# Patient Record
Sex: FEMALE | Race: WHITE | NOT HISPANIC OR LATINO | Employment: FULL TIME | URBAN - METROPOLITAN AREA
[De-identification: names, ages, dates, MRNs, and addresses within clinical notes are randomized per-mention and may not be internally consistent; named-entity substitution may affect disease eponyms.]

---

## 2018-11-20 ENCOUNTER — OFFICE VISIT CONVERTED (OUTPATIENT)
Dept: PULMONOLOGY | Facility: CLINIC | Age: 48
End: 2018-11-20
Attending: INTERNAL MEDICINE

## 2018-12-11 ENCOUNTER — OFFICE VISIT CONVERTED (OUTPATIENT)
Dept: SURGERY | Facility: CLINIC | Age: 48
End: 2018-12-11
Attending: SURGERY

## 2018-12-21 ENCOUNTER — OFFICE VISIT CONVERTED (OUTPATIENT)
Dept: PULMONOLOGY | Facility: CLINIC | Age: 48
End: 2018-12-21
Attending: INTERNAL MEDICINE

## 2019-02-15 ENCOUNTER — OFFICE VISIT CONVERTED (OUTPATIENT)
Dept: PULMONOLOGY | Facility: CLINIC | Age: 49
End: 2019-02-15
Attending: INTERNAL MEDICINE

## 2019-03-01 ENCOUNTER — HOSPITAL ENCOUNTER (OUTPATIENT)
Dept: GASTROENTEROLOGY | Facility: HOSPITAL | Age: 49
Setting detail: HOSPITAL OUTPATIENT SURGERY
Discharge: HOME | End: 2019-03-01
Attending: INTERNAL MEDICINE

## 2019-03-01 LAB
EPI CELLS NFR FLD: 12 %
GLUCOSE BLD-MCNC: 89 MG/DL (ref 65–99)
HCG UR QL: NEGATIVE
LYMPHOCYTES NFR FLD MANUAL: 9 %
MACROPHAGE FLUID: 20 /100{WBCS}
NEUTROPHILS NFR FLD MANUAL: 59 %
VISUAL PRESENCE OF BLOOD: NORMAL

## 2019-03-03 LAB
BACTERIA SPEC AEROBE CULT: ABNORMAL
BACTERIA SPEC AEROBE CULT: ABNORMAL
CONV BRONCHIAL WASH CULTURE: ABNORMAL

## 2019-03-07 LAB
CONV ADENOVIRUS  (BAL OR WASH): NEGATIVE
FLUAV RNA SPEC QL NAA+PROBE: NEGATIVE
FLUBV RNA ISLT QL NAA+PROBE: NEGATIVE
HMPV RNA SPEC QL NAA+PROBE: NEGATIVE
HPIV1 RNA ISLT QL NAA+PROBE: NEGATIVE
HPIV2 SPEC QL CULT: NEGATIVE
HPIV3 SPEC QL CULT: NEGATIVE
RHINOVIRUS RNA SPEC QL NAA+PROBE: NEGATIVE
RSV A: NEGATIVE
RSV B RNA SPEC QL NAA+PROBE: NEGATIVE

## 2019-03-13 ENCOUNTER — OFFICE VISIT CONVERTED (OUTPATIENT)
Dept: PULMONOLOGY | Facility: CLINIC | Age: 49
End: 2019-03-13
Attending: INTERNAL MEDICINE

## 2019-04-03 ENCOUNTER — OFFICE VISIT (OUTPATIENT)
Dept: GASTROENTEROLOGY | Facility: CLINIC | Age: 49
End: 2019-04-03

## 2019-04-03 VITALS
HEIGHT: 64 IN | TEMPERATURE: 98.4 F | BODY MASS INDEX: 28.92 KG/M2 | DIASTOLIC BLOOD PRESSURE: 74 MMHG | WEIGHT: 169.4 LBS | SYSTOLIC BLOOD PRESSURE: 112 MMHG

## 2019-04-03 DIAGNOSIS — R19.7 DIARRHEA, UNSPECIFIED TYPE: Primary | ICD-10-CM

## 2019-04-03 DIAGNOSIS — R79.82 ELEVATED C-REACTIVE PROTEIN (CRP): ICD-10-CM

## 2019-04-03 DIAGNOSIS — R76.8 P-ANCA TITER POSITIVE: ICD-10-CM

## 2019-04-03 DIAGNOSIS — R10.30 LOWER ABDOMINAL PAIN: ICD-10-CM

## 2019-04-03 PROCEDURE — 99204 OFFICE O/P NEW MOD 45 MIN: CPT | Performed by: INTERNAL MEDICINE

## 2019-04-03 RX ORDER — METHYLPREDNISOLONE 4 MG/1
4 TABLET ORAL DAILY
COMMUNITY

## 2019-04-03 RX ORDER — ALPRAZOLAM 1 MG/1
1 TABLET ORAL 2 TIMES DAILY
Refills: 0 | COMMUNITY
Start: 2019-03-18

## 2019-04-03 RX ORDER — MONTELUKAST SODIUM 10 MG/1
10 TABLET ORAL NIGHTLY
COMMUNITY

## 2019-04-03 RX ORDER — ASPIRIN 81 MG/1
81 TABLET ORAL DAILY
COMMUNITY

## 2019-04-03 RX ORDER — LISINOPRIL 10 MG/1
10 TABLET ORAL DAILY
Refills: 1 | COMMUNITY
Start: 2019-03-04

## 2019-04-03 RX ORDER — GABAPENTIN 600 MG/1
600 TABLET ORAL 3 TIMES DAILY
COMMUNITY

## 2019-04-03 RX ORDER — ROFLUMILAST 500 UG/1
500 TABLET ORAL DAILY
COMMUNITY

## 2019-04-03 RX ORDER — SERTRALINE HYDROCHLORIDE 100 MG/1
100 TABLET, FILM COATED ORAL DAILY
COMMUNITY
Start: 2019-02-04

## 2019-04-03 RX ORDER — LANSOPRAZOLE 30 MG/1
30 CAPSULE, DELAYED RELEASE ORAL DAILY
COMMUNITY

## 2019-04-03 RX ORDER — HYOSCYAMINE SULFATE 0.125 MG
0.12 TABLET ORAL EVERY 6 HOURS PRN
Qty: 90 TABLET | Refills: 1 | Status: SHIPPED | OUTPATIENT
Start: 2019-04-03

## 2019-04-03 RX ORDER — FEXOFENADINE HCL 180 MG/1
180 TABLET ORAL DAILY
COMMUNITY

## 2019-04-03 RX ORDER — IBUPROFEN 800 MG/1
800 TABLET ORAL EVERY 6 HOURS PRN
COMMUNITY

## 2019-04-03 NOTE — PROGRESS NOTES
Chief Complaint   Patient presents with   • Diarrhea   • GI Problem     burning mouth   • Difficulty Swallowing   • Nausea   • Heartburn   • Constipation       Subjective     HPI    Abigail Keene is a 48 y.o. female with a past medical history noted below who presents for evaluation of multiple GI complaints but primarily diarrhea.  Symptoms started a year ago without precipitant.  She developed diarrhea.  She reported BMs after eating, drinking, up to 10x per day.  Stool was loose.  Associated with cramping.  This included nocturnal stools.  Tried imodium which helped but did not stop the symptoms.  She was also having nausea and vomiting too.    EGD and colonoscopy with Dr Concepcion in December 2018-- records reviewed-- hemorrhoids, sigmoid HP, no H pylori.  He removed her hemorrhoids surgically.  No random colon biopsies were taken    Since her colonoscopy, she now complains of joint pain-- every joint hurts.  She is now having alternating diarrhea and constipation.  No BMs for a few days followed by by diarrhea.  She has lower abdominal pain/cramping before her bowel movements.    Complains of a burning mouth, bad breath.    She had lab tests in February showing an elevated CRP at 7.5.  Her atypical PANCA was positive and her rheumatologist told her she had GI issues.      She is steroid dependent for her asthma.      She sees Dr Santo in [place] for rheumatology.    Mother with colon polyps.  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    Not smoking, not drinking  .    Negative celiac.  Mildly elevated AST and ALT (just above 30) that has been chronic.  She reports imaging showing fatty liver.    She is from [facility].      Past Medical History:   Diagnosis Date   • Adrenal insufficiency (CMS/HCC)    • Anemia    • Fibromyalgia    • GERD (gastroesophageal reflux disease)    • History of Legionnaire's disease    • Hypertension    • Polyarthritis           Current Outpatient Medications:   •  Albuterol (VENTOLIN IN), Inhale 2 puffs Every 4 (Four) Hours., Disp: , Rfl:   •  ALPRAZolam (XANAX) 1 MG tablet, Take 1 mg by mouth 2 (Two) Times a Day., Disp: , Rfl: 0  •  aspirin 81 MG EC tablet, Take 81 mg by mouth Daily., Disp: , Rfl:   •  Azelastine-Fluticasone (DYMISTA NA), 1 spray into the nostril(s) as directed by provider 2 (Two) Times a Day., Disp: , Rfl:   •  Beclomethasone Dipropionate (QVAR IN), Inhale Daily., Disp: , Rfl:   •  budesonide 0.25 MG/2ML suspension, Inhale Daily., Disp: , Rfl:   •  Cyanocobalamin (VITAMIN B-12 PO), Take  by mouth Daily., Disp: , Rfl:   •  fexofenadine (ALLEGRA) 180 MG tablet, Take 180 mg by mouth Daily., Disp: , Rfl:   •  FORMOTEROL FUMARATE IN, Inhale 20 mg 2 (Two) Times a Day., Disp: , Rfl:   •  gabapentin (NEURONTIN) 600 MG tablet, Take 600 mg by mouth 3 (Three) Times a Day., Disp: , Rfl:   •  HYDROcod Polst-CPM Polst ER (TUSSIONEX PENNKINETIC) 10-8 MG/5ML ER suspension, Take  by mouth 2 (Two) Times a Day., Disp: , Rfl: 0  •  ibuprofen (ADVIL,MOTRIN) 800 MG tablet, Take 800 mg by mouth Every 6 (Six) Hours As Needed for Mild Pain ., Disp: , Rfl:   •  ipratropium (ATROVENT HFA) 17 MCG/ACT inhaler, Inhale 2 puffs 4 (Four) Times a Day., Disp: , Rfl:   •  ipratropium-albuterol (COMBIVENT RESPIMAT)  MCG/ACT inhaler, Inhale 1 puff 4 (Four) Times a Day As Needed for Wheezing., Disp: , Rfl:   •  lansoprazole (PREVACID) 30 MG capsule, Take 30 mg by mouth Daily., Disp: , Rfl:   •  lisinopril (PRINIVIL,ZESTRIL) 10 MG tablet, Take 10 mg by mouth Daily., Disp: , Rfl: 1  •  methylPREDNISolone (MEDROL) 4 MG tablet, Take 4 mg by mouth Daily., Disp: , Rfl:   •  montelukast (SINGULAIR) 10 MG tablet, Take 10 mg by mouth Every Night., Disp: , Rfl:   •  Multiple Vitamins-Minerals (ZINC PO), Take  by mouth Daily., Disp: , Rfl:   •  Pyridoxine HCl (VITAMIN B6 PO), Take  by mouth Daily., Disp: , Rfl:   •  roflumilast (DALIRESP) 500 MCG tablet  tablet, Take 500 mcg by mouth Daily., Disp: , Rfl:   •  sertraline (ZOLOFT) 100 MG tablet, Take 100 mg by mouth Daily., Disp: , Rfl:   •  tiotropium (SPIRIVA) 18 MCG per inhalation capsule, Place 1 capsule into inhaler and inhale Daily., Disp: , Rfl:   •  VITAMIN A PO, Take  by mouth Daily., Disp: , Rfl:   •  hyoscyamine (ANASPAZ,LEVSIN) 0.125 MG tablet, Take 1 tablet by mouth Every 6 (Six) Hours As Needed for Cramping or Diarrhea., Disp: 90 tablet, Rfl: 1    Allergies   Allergen Reactions   • Lincomycin Anaphylaxis   • Celexa [Citalopram] Hives       Social History     Socioeconomic History   • Marital status: Single     Spouse name: Not on file   • Number of children: Not on file   • Years of education: Not on file   • Highest education level: Not on file   Tobacco Use   • Smoking status: Former Smoker   • Smokeless tobacco: Never Used       Family History   Problem Relation Age of Onset   • Liver cancer Maternal Great-Grandfather        Review of Systems   Constitutional: Negative for activity change, appetite change and fatigue.   HENT: Negative for sore throat and trouble swallowing.         Burning mouth   Respiratory: Negative.    Cardiovascular: Negative.    Gastrointestinal: Positive for abdominal pain and diarrhea. Negative for abdominal distention and blood in stool.   Endocrine: Negative for cold intolerance and heat intolerance.   Genitourinary: Negative for difficulty urinating, dysuria and frequency.   Musculoskeletal: Positive for arthralgias. Negative for back pain and myalgias.   Skin: Negative.    Hematological: Negative for adenopathy. Does not bruise/bleed easily.   All other systems reviewed and are negative.      Objective     Vitals:    04/03/19 1609   BP: 112/74   Temp: 98.4 °F (36.9 °C)         04/03/19  1609   Weight: 76.8 kg (169 lb 6.4 oz)     Body mass index is 29.08 kg/m².    Physical Exam   Constitutional: She is oriented to person, place, and time. She appears well-developed and  well-nourished. No distress.   HENT:   Head: Normocephalic and atraumatic.   Right Ear: External ear normal.   Left Ear: External ear normal.   Nose: Nose normal.   Mouth/Throat: Oropharynx is clear and moist.   Eyes: Conjunctivae and EOM are normal. Right eye exhibits no discharge. Left eye exhibits no discharge. No scleral icterus.   Neck: Normal range of motion. Neck supple. No thyromegaly present.   No supraclavicular adenopathy   Cardiovascular: Normal rate, regular rhythm, normal heart sounds and intact distal pulses. Exam reveals no gallop.   No murmur heard.  No lower extremity edema   Pulmonary/Chest: Effort normal and breath sounds normal. No respiratory distress. She has no wheezes.   Abdominal: Soft. Normal appearance and bowel sounds are normal. She exhibits no distension and no mass. There is no hepatosplenomegaly. There is no tenderness. There is no rigidity, no rebound and no guarding. No hernia.   Genitourinary:   Genitourinary Comments: Rectal exam deferred   Musculoskeletal: Normal range of motion. She exhibits no edema or tenderness.   No atrophy of upper or lower extremities.  Normal digits and nails of both hands.   Lymphadenopathy:     She has no cervical adenopathy.   Neurological: She is alert and oriented to person, place, and time. She displays no atrophy. Coordination normal.   Skin: Skin is warm and dry. No rash noted. She is not diaphoretic. No erythema.   Psychiatric: She has a normal mood and affect. Her behavior is normal. Judgment and thought content normal.   Vitals reviewed.      No results found for: WBC, RBC, HGB, HCT, MCV, MCH, MCHC, RDW, RDWSD, MPV, PLT, NEUTRORELPCT, LYMPHORELPCT, MONORELPCT, EOSRELPCT, BASORELPCT, AUTOIGPER, NEUTROABS, LYMPHSABS, MONOSABS, EOSABS, BASOSABS, AUTOIGNUM, NRBC    No results found for: GLUCOSE, NA, K, CO2, CL, ANIONGAP, CREATININE, BUN, BCR, CALCIUM, EGFRIFNONA, ALKPHOS, PROTEINTOT, ALT, AST, BILITOT, ALBUMIN, GLOB, LABIL2      Imaging Results  (last 7 days)     ** No results found for the last 168 hours. **            No notes on file    Assessment/Plan    Diarrhea: Present for about a year.  I reviewed her colonoscopy, reports of normal tissue though no random biopsies were taken.  Certainly there is concern that she could have an inflammatory bowel disease however P Aruna is usually very nonspecific for this.  Also given her recent onset of now alternating diarrhea and constipation this makes me think of a more likely irritable bowel syndrome    Lower abdominal pain: Preceding her bowel movements    Elevated CRP: Possibly IBD related but she also has chronic lung disease as well as possible rheumatologic disease    P-ANCA positive: I am uncertain of the significance of this    Plan  Trial of Levsin for her abdominal pain, diarrhea  We will check a CT of the abdomen and pelvis to assess for any inflammation of the small intestine, colon--I have given her a handwritten order for this that she wishes to have this done back home  I am unclear on how all of her other symptoms including the burning gums/mouth and joint pain correlate here; she does report long-term steroid use for her asthma  We may ultimately need to end up repeating her colonoscopy.  Unfortunately her living so far away is likely to be an impediment    Abigail was seen today for diarrhea, gi problem, difficulty swallowing, nausea, heartburn and constipation.    Diagnoses and all orders for this visit:    Diarrhea, unspecified type    Lower abdominal pain    P-ANCA titer positive    Elevated C-reactive protein (CRP)    Other orders  -     hyoscyamine (ANASPAZ,LEVSIN) 0.125 MG tablet; Take 1 tablet by mouth Every 6 (Six) Hours As Needed for Cramping or Diarrhea.        I have discussed the above plan with the patient.  They verbalize understanding and are in agreement with the plan.  They have been advised to contact the office for any questions, concerns, or changes related to their  health.    Dictated utilizing Dragon dictation

## 2019-04-03 NOTE — PATIENT INSTRUCTIONS
Try the levsin for the pain, diarrhea    CT scan of the abdomen    For any additional questions, concerns or changes to your condition after today's office visit please contact the office at [phone].

## 2019-04-18 DIAGNOSIS — R19.7 DIARRHEA, UNSPECIFIED TYPE: ICD-10-CM

## 2019-04-18 DIAGNOSIS — R10.30 LOWER ABDOMINAL PAIN: Primary | ICD-10-CM

## 2019-04-29 NOTE — PROGRESS NOTES
Her CT scan showed mild fatty liver, a left ovarian cyst that is considered benign and a left kidney stone that is nonobstructive.   No evidence of any inflammation in the GI tract.

## 2019-04-30 ENCOUNTER — TELEPHONE (OUTPATIENT)
Dept: GASTROENTEROLOGY | Facility: CLINIC | Age: 49
End: 2019-04-30

## 2019-04-30 NOTE — TELEPHONE ENCOUNTER
Called pt and advised per Dr Donohue that her ct scan showed mild fatty liver, a left ovarian cyst that is considered benign and a left kidney stone that is nonobstructive.  No evidence of any inflammation in the gi tract.  Pt verb understanding.

## 2019-04-30 NOTE — TELEPHONE ENCOUNTER
----- Message from Ayana Donohue MD sent at 4/29/2019  5:26 PM EDT -----  Her CT scan showed mild fatty liver, a left ovarian cyst that is considered benign and a left kidney stone that is nonobstructive.   No evidence of any inflammation in the GI tract.

## 2021-05-15 VITALS — BODY MASS INDEX: 28.04 KG/M2 | WEIGHT: 164.25 LBS | HEIGHT: 64 IN | RESPIRATION RATE: 14 BRPM

## 2021-05-28 VITALS
RESPIRATION RATE: 12 BRPM | DIASTOLIC BLOOD PRESSURE: 80 MMHG | HEIGHT: 64 IN | HEIGHT: 64 IN | HEART RATE: 94 BPM | BODY MASS INDEX: 30.48 KG/M2 | SYSTOLIC BLOOD PRESSURE: 124 MMHG | TEMPERATURE: 98.4 F | TEMPERATURE: 98.1 F | TEMPERATURE: 98.4 F | OXYGEN SATURATION: 96 % | WEIGHT: 172 LBS | WEIGHT: 165.5 LBS | OXYGEN SATURATION: 99 % | WEIGHT: 167.31 LBS | DIASTOLIC BLOOD PRESSURE: 80 MMHG | HEIGHT: 64 IN | RESPIRATION RATE: 16 BRPM | BODY MASS INDEX: 28.56 KG/M2 | SYSTOLIC BLOOD PRESSURE: 139 MMHG | SYSTOLIC BLOOD PRESSURE: 133 MMHG | HEART RATE: 99 BPM | RESPIRATION RATE: 14 BRPM | HEART RATE: 126 BPM | RESPIRATION RATE: 12 BRPM | HEART RATE: 98 BPM | HEIGHT: 63 IN | BODY MASS INDEX: 28.25 KG/M2 | DIASTOLIC BLOOD PRESSURE: 88 MMHG | TEMPERATURE: 98.8 F | BODY MASS INDEX: 28.42 KG/M2 | DIASTOLIC BLOOD PRESSURE: 91 MMHG | SYSTOLIC BLOOD PRESSURE: 126 MMHG | OXYGEN SATURATION: 96 % | WEIGHT: 166.5 LBS | OXYGEN SATURATION: 99 %

## 2021-05-28 NOTE — PROGRESS NOTES
Patient: LINDA STEVEN     Acct: [id number]     Report: #[id number]  UNIT #: [id number]     : 1970    Encounter Date:2018  PRIMARY CARE: MONA ADHIKARI  ***Signed***  --------------------------------------------------------------------------------------------------------------------  Chief Complaint      Encounter Date      Dec 21, 2018            Primary Care Provider            mona adhikari            Referring Provider            mona adhikari            Patient Complaint      Patient is complaining of      1 mo f/u chest ct results            VITALS      Height 5 ft 4 in / 162.56 cm      Weight 167 lbs 5 oz / 75.727854 kg      BSA 1.81 m2      BMI 28.7 kg/m2      Temperature 98.4 F / 36.89 C - Oral      Pulse 98      Respirations 14      Blood Pressure 124/80 Sitting, Left Arm      Pulse Oximetry 96%, roomair      Initial Exhaled Nitrous Oxide      Date:  2018      Exhaled Nitrous Oxide Results:  120            Repeated Exhaled Nitrous Oxide      Date:  Dec 21, 2018      Repeated Nitrous Oxide Results:  70            HPI      The patient is a very pleasant 48 year old  female with very severe     allergic asthma here today for follow up.             Since her last office visit she is still in the process of getting her Xolair as    she had a fasenra allergy. She felt better after completing her course of     steroids and placed on Perforomist and Pulmicort however she has been having     worsening symptoms since Monday. She gets short of breath with almost any     activity and has wheezing nonstop. She has a chronic cough that is productive of    thin, clear sputum and has recently been yellow and green. She denies any fevers    or chills but has been fatigued with some sick contacts. She denies any body     aches or myalgias. She denies any nausea and vomiting, fevers or chills,     headaches or hemoptysis or chest pain. She has a history of elevated IgE of 57     and FEV1 of  56%. She also has a history of alpha-1 antitrypsin heterozygosity     with genotype MZ level 175. She denies any other complaints and is able to     perform her activities of daily living without difficulty and denies any swollen    lymph nodes or glands in her head and neck.             I have personally reviewed the Review of Systems, past family, social, surgical     and medical histories and I agree with the findings.            ROS      Constitutional:  Denies: Fatigue, Fever, Weight gain, Weight loss, Chills,     Insomnia, Other      Respiratory/Breathing:  Complains of: Shortness of air, Wheezing, Cough; Denies:    Hemoptysis, Pleuritic pain, Other      Endocrine:  Denies: Polydipsia, Polyuria, Heat/cold intolerance, Abnorml     menstrual pattern, Diabetes, Other      Eyes:  Denies: Blurred vision, Vision Changes, Other      Ears, nose, mouth, throat:  Denies: Mouth lesions, Thrush, Throat pain,     Hoarseness, Allergies/Hay Fever, Post Nasal Drip, Headaches, Recent Head Injury,    Nose Bleeding, Neck Stiffness, Thyroid Mass, Hearing Loss, Ear Fullness, Dry     Mouth, Nasal or Sinus Pain, Dry Lips, Nasal discharge, Nasal congestion, Other      Cardiovascular:  Denies: Palpitations, Syncope, Claudication, Chest Pain, Wake     up Gasping for air, Leg Swelling, Irregular Heart Rate, Cyanosis, Dyspnea on     Exertion, Other      Gastrointestinal:  Denies: Nausea, Constipation, Diarrhea, Abdominal pain,     Vomiting, Difficulty Swallowing, Reflux/Heartburn, Dysphagia, Jaundice,     Bloating, Melena, Bloody stools, Other      Genitourinary:  Denies: Urinary frequency, Incontinence, Hematuria, Urgency,     Nocturia, Dysuria, Testicular problems, Other      Musculoskeletal:  Denies: Joint Pain, Joint Stiffness, Joint Swelling, Myalgias,    Other      Hematologic/lymphatic:  DENIES: Lymphadenopathy, Bruising, Bleeding tendencies,     Other      Neurological:  Denies: Headache, Numbness, Weakness, Seizures, Other       Psychiatric:  Denies: Anxiety, Appropriate Effect, Depression, Other      Sleep:  No: Excessive daytime sleep, Morning Headache?, Snoring, Insomnia?, Stop    breathing at sleep?, Other      Integumentary:  Denies: Rash, Dry skin, Skin Warm to Touch, Other      Immunologic/Allergic:  Denies: Latex allergy, Seasonal allergies, Asthma,     Urticaria, Eczema, Other      Immunization status:  No: Up to date            FAMILY/SOCIAL/MEDICAL HX      Surgical History:  Yes: Bowel Surgery, Cholecystectomy, Head Surgery (sinus x     3), Hernia Surgery, Oral Surgery (WISDOM TEETH EXTRACTION), Other Surgeries (ranulfo    my tuck/ sleep apnea surgery)      Stroke - Family Hx:  Mother      Heart - Family Hx:  Father      Diabetes - Family Hx:  Father      Cancer/Type - Family Hx:  Grandparent      Is Father Still Living?:  Yes      Is Mother Still Living?:  Yes       Family History:  Yes      Social History:  No Tobacco Use, No Alcohol Use, No Recreational Drug use      Smoking status:  Former smoker (on and off smoker x 20 y quit 2006)      Anticoagulation Therapy:  No      Antibiotic Prophylaxis:  No      Medical History:  Yes: Arthritis, Asthma (NEB [place],INHALERS), Chronic     Bronchitis/COPD, Diabetes (TYPE II, DOES NOT CHECK IN BS), Hemorrhoids/Rectal     Prob (HIATAL HERNIA, DIARRHEA, VOMITING, NAUSEA, HEMORRHOID), Shortness Of     Breath, Stroke; No: Blood Disease, Chemotherapy/Cancer, Deafness or Ringing     Ears, Sinus Trouble, Miscellaneous Medical/oth      Psychiatric History      none            PREVENTION      Hx Influenza Vaccination:  Yes      Date Influenza Vaccine Given:  Nov 1, 2018      Influenza Vaccine Declined:  No      2 or More Falls Past Year?:  No      Fall Past Year with Injury?:  No      Hx Pneumococcal Vaccination:  Yes      Encouraged to follow-up with:  PCP regarding preventative exams.      Chart initiated by      gui/ ma            ALLERGIES/MEDICATIONS      Allergies:        Coded Allergies:              CITALOPRAM (Verified  Allergy, Unknown, ITCH,HIVES, 12/21/18)           LINCOMYCIN (Verified  Allergy, Unknown, ANAPHYLACTIC, 12/21/18)      Medications    Last Reconciled on 12/21/18 16:20 by KAR CERVANTES MD      Fluconazole (Diflucan) 150 Mg Tablet      150 MG PO ONCE for 1 Day, #1 TAB         Prov: KAR CERVANTES         12/21/18       Amoxicillin/Clavulanate K (Augmentin 875/125 Mg) 1 Each Tablet      875 MG PO BID, #14 TAB 0 Refills         Prov: KAR CERVANTES         12/21/18       predniSONE* (Deltasone*) 10 Mg Tablet      10 MG PO ASDIR, #45 TAB 0 Refills         Prov: KAR CERVANTES         12/21/18       Beclomethasone Dipropionate (Qvar 80 Redihaler 10.6 GM) 10.6 Gm Hfa.aeroba      1 PUFF INH RTBID, #1 INH 5 Refills         Prov: KAR CERVANTES         12/21/18       Tiotropium Bromide (Spiriva Respimat 1.25 mcg/puff) 4 Gm Mist.inhal      2 PUFFS INH RTQDAY, #1 INH 5 Refills         Prov: KAR CERVANTES         12/21/18       Hydrocodone/Acetaminophen 5/325 MG (Hydrocodone/Acetaminophen 5/325 MG) 1 Each     Tablet      1 TAB PO Q4H PRN for BREAKTHROUGH PAIN, TAB 0 Refills         Reported         12/21/18       (Fasenra Injection)   No Conflict Check               Reported         12/13/18       Ranitidine Hcl (Ranitidine*) 150 Mg Tablet      150 MG PO BID, #60 TAB 0 Refills         Reported         12/13/18       Aspirin EC (Aspirin EC) 81 Mg Tablet.dr      81 MG PO QDAY, #30 TAB 0 Refills         Reported         12/13/18       Sertraline HCl (Sertraline*) 50 Mg Tablet      50 MG PO QDAY, #30 TAB 0 Refills         Reported         12/13/18       Cholecalciferol (Vitamin D3) 50,000 Unit Capsule      35026 UNITS PO Q7D, CAP         Reported         12/13/18       Hydrocodone/Chlorpheniramine (Hydrocodone/Chlorpheniramine) 473 Ml Mireille.er.12h      5 ML PO BID PRN for COUGH, #60 ML         Reported         12/13/18       Neb-Albuterol/Ipratropium (Ipratropium/Albuterol) 3 Ml Ampul.neb      3 ML  INH Q6H PRN for SHORTNESS OF BREATH, #120 NEB 0 Refills         Reported         12/13/18       Neb-Budesonide (Pulmicort) 1 Mg Nebu      1 MG INH RTBID, #60 NEB 5 Refills         Prov: KAR CERVANTES         11/20/18       Formoterol Fumarate (Perforomist) 20 Mcg/2 Ml Vial.neb      20 MCG INH BID, #60 NEB 5 Refills         Prov: KAR CERVANTES         11/20/18       Azelastine/Fluticasone (Dymista Nasal Spray) 23 Gm Spray.pump      1 SPRAYS NARE EACH BID, #1 BOTTLE 5 Refills         Prov: KAR CERVANTES         11/20/18       Lansoprazole (Lansoprazole*) 30 Mg Capsule.dr      30 MG PO QDAY, CAP         Reported         11/20/18       Promethazine HCl (Phenergan*) 25 Mg Tablet      25 MG PO BID, #60 TAB 0 Refills         Reported         11/20/18       Alprazolam (Alprazolam) 1 Mg Tablet      1 MG PO BID, #60 TAB         Reported         11/20/18       Lisinopril* (Lisinopril*) 10 Mg Tablet      10 MG PO QDAY, #30 TAB 0 Refills         Reported         11/20/18       methylPREDNISolone (Medrol*) 4 Mg Tablet      6 MG PO BID, TAB 0 Refills         Reported         11/20/18       MDI-Albuterol (Ventolin HFA) 8 Gm Hfa.aer.ad      2 PUFFS INH Q4H PRN for SHORTNESS OF BREATH, #1 MDI 0 Refills         Reported         11/20/18       Montelukast Sodium (Singulair*) 10 Mg Tablet      10 MG PO QDAY, #30 TAB 0 Refills         Reported         11/20/18       Roflumilast (Daliresp) 500 Mcg Tab      500 MCG PO QDAY for 30 Days, #30 TAB         Reported         11/20/18       Fexofenadine Hcl (Fexofenadine Hcl) 180 Mg Tablet      180 MG PO QDAY, #30 TAB 0 Refills         Reported         11/20/18      Current Medications      Current Medications Reviewed 12/21/18            EXAM      Vital Signs Reviewed      Gen: WDWN, Alert, NAD.        HEENT:  PERRL, EOMI.  OP, nares clear, no sinus tenderness.      Chest:  Good aeration, diminished breath sounds with course wheezing and rhonchi    in all lung fields, tympanic to percussion  bilaterally, no work of breathing     noted.      CV:  RRR, no MGR, pulses 2+, equal.      Abd:  Soft, NT, ND, + BS, no HSM.       EXT:  No clubbing, no cyanosis, no edema.       Neuro:  A  Skin: No rashes or lesions.      Vtials      Vitals:             Height 5 ft 4 in / 162.56 cm           Weight 167 lbs 5 oz / 75.603142 kg           BSA 1.81 m2           BMI 28.7 kg/m2           Temperature 98.4 F / 36.89 C - Oral           Pulse 98           Respirations 14           Blood Pressure 124/80 Sitting, Left Arm           Pulse Oximetry 96%, roomair            REVIEW      Results Reviewed      PCCS Results Reviewed?:  Yes Prev Radiology Results, Yes Previous [facility] Records      Radiographic Results      I personally reviewed the patient's chest x-ray showing no acute disease in     December 2018.            Assessment      Severe asthma         Severe persistent asthma with acute exacerbation - J45.51         Asthma persistence: persistent         Asthma complication type: with acute exacerbation            Allergic rhinitis         Seasonal allergic rhinitis due to pollen - J30.1         Allergic rhinitis trigger: pollen         Allergic rhinitis seasonality: seasonal            Seasonal allergies - J30.2            Cough - R05            Dyspnea         Dyspnea on exertion - R06.09         Dyspnea type: dyspnea on exertion            Wheeze - R06.2            Notes      New Medications      * Hydrocodone/Acetaminophen 5/325 MG 1 EACH TABLET: 1 TAB PO Q4H PRN       BREAKTHROUGH PAIN      * TIOTROPIUM BROMIDE (Spiriva Respimat 1.25 mcg/puff) 4 GM MIST.INHAL: 2 PUFFS       INH RTQDAY #1      * Beclomethasone Dipropionate (Qvar 80 Redihaler 10.6 GM) 10.6 GM HFA.AEROBA: 1       PUFF INH RTBID #1      * predniSONE* (Deltasone*) 10 MG TABLET: 10 MG PO ASDIR #45         Instructions: [id number],[id number],[id number],[id number]      * AMOXICILLIN/CLAVULANATE K (Augmentin 875/125 Mg) 1 EACH TABLET: 875 MG PO BID       #14          Dx: Cough - R05      * Fluconazole (Diflucan) 150 MG TABLET: 150 MG PO ONCE 1 Day #1      New Diagnostics      * Sputum Culture W/Gram Stain, Week         Dx: Cough - R05      New Office Procedures      * Solu-Medrol 125 MG, As Soon As Possible         METHYLPRED SOD SUCC (Solu-Medrol) 125 MG VIAL: 125 MILLIGRAM INTRAMUSC Qty 1        VIAL         Dx: Severe asthma - J45.909      IMPRESSION:      1. Dyspnea on exertion .      2. Cough.       3. Wheeze.       4. Acute exacerbation of severe persistent allergic asthma. She is doing better     on Perforomist and Pulmicort therapy however she would benefit from LAMA therapy    as well as addition of Qvar. She is also waiting on getting Xolair as she had an    allergy to fasenra. She may end up needing to be on Dupixent.      5. Seasonal allergies poorly controlled.       6. Allergic rhinitis well controlled.       7. Tobacco abuse of cigarettes in remission.      8. Alpha-1 antitrypsin heterozygote, genotype MZ level 175.             PLAN:      1. I performed exhaled nitrous oxide testing in the office today and level is     decreased to 70, previous 120 showing compliance with steroids but still severe     degree of eosinophilic airway inflammation.      2. Check sputum culture and give 7 days of Augmentin.       3. Give Solu-Medrol shot and 2 weeks steroid taper.       4. Continue Spiriva, Perforomist and Pulmicort. Add Qvar 80 twice daily.       5. Continue Singulair.       6. We will follow up on Xolair as I feel she might benefit from Xolair injec    tions.       7. Up to date with Prevnar and flu vaccines. No indication for Pneumovax.       8.  We will have the patient follow up with me in 2 months.            Patient Education      Patient Education Provided:  Acute Asthma, How to use an Inhaler      Other Education:  xolair. add qvar                 Disclaimer: Converted document may not contain table formatting or lab diagrams. Please see [employer] for the authenticated document.

## 2021-05-28 NOTE — PROGRESS NOTES
Patient: LINDA STEVEN     Acct: [id number]     Report: #[id number]  UNIT #: [id number]     : 1970    Encounter Date:02/15/2019  PRIMARY CARE: MONA ADHIKARI  ***Signed***  --------------------------------------------------------------------------------------------------------------------  Chief Complaint      Encounter Date      Feb 15, 2019            Primary Care Provider            mona adhikari            Referring Provider            mona adhikari            Patient Complaint      Patient is complaining of      f/u sputum results            VITALS      Height 5 ft 3 in / 160.02 cm      Weight 172 lbs 0 oz / 78.027173 kg      BSA 1.81 m2      BMI 30.5 kg/m2      Temperature 98.1 F / 36.72 C - Oral      Pulse 99      Respirations 12      Blood Pressure 133/88 Sitting, Right Arm      Pulse Oximetry 99%, roomair      Initial Exhaled Nitrous Oxide      Date:  2018      Exhaled Nitrous Oxide Results:  120            Repeated Exhaled Nitrous Oxide      Date:  Feb 15, 2019      Repeated Nitrous Oxide Results:  70            HPI      The patient is a very pleasant 48 year old  male never smoker with very    severe allergic asthma without peripheral eosinophilia here for follow up.  She     had a fasenra allergy and we will try to switch her to Xolair.  We have tried     this multiple and Xolair is not getting approved. Her peripheral eosinophilia is    zero on triple inhaler nebulizer therapy and thus we added Qvar and gave her a     course of Augmentin and steroids. She felt the Qvar was helping out quite a bit     and the steroids and Augmentin help loosen everything up to the point where she     is drowning.  She is here today complaining of 1-2 of increasing tightness and     trouble moving in air.  She is very wheezy with almost any activity. She has     cough constantly and feels like secretions are stuck in her airways and they are    dry, coughing throughout the day. She gets short of  breath with walking 200-300     feet, is moderate to severe in severity, worse with exertion, relieved with     rest.  She is using her rescue inhaler nebulizer 4-6 times per day. She has so     much trouble with her breathing that she is having to take multiple leaves of     absence from work.  She denies any recent seasonal allergies, itchy-scratchy     throat, watery eyes or nasal congestion.  She has a history of alpha 1     antitrypsin heterozygote and does have an FEV1 of 56% of predicted. She is able     to perform ADLs without difficulty.  Denies any swollen glands or lymph nodes of    the head and neck.            I have personally reviewed the review of systems, past family, social, surgical     and medical histories and I agree with the findings.            ROS      Constitutional:  Denies: Fatigue, Fever, Weight gain, Weight loss, Chills,     Insomnia, Other      Respiratory/Breathing:  Complains of: Shortness of air, Wheezing, Cough; Denies:    Hemoptysis, Pleuritic pain, Other      Endocrine:  Denies: Polydipsia, Polyuria, Heat/cold intolerance, Abnorml     menstrual pattern, Diabetes, Other      Eyes:  Denies: Blurred vision, Vision Changes, Other      Ears, nose, mouth, throat:  Denies: Mouth lesions, Thrush, Throat pain,     Hoarseness, Allergies/Hay Fever, Post Nasal Drip, Headaches, Recent Head Injury,    Nose Bleeding, Neck Stiffness, Thyroid Mass, Hearing Loss, Ear Fullness, Dry     Mouth, Nasal or Sinus Pain, Dry Lips, Nasal discharge, Nasal congestion, Other      Cardiovascular:  Denies: Palpitations, Syncope, Claudication, Chest Pain, Wake     up Gasping for air, Leg Swelling, Irregular Heart Rate, Cyanosis, Dyspnea on     Exertion, Other      Gastrointestinal:  Denies: Nausea, Constipation, Diarrhea, Abdominal pain,     Vomiting, Difficulty Swallowing, Reflux/Heartburn, Dysphagia, Jaundice,     Bloating, Melena, Bloody stools, Other      Genitourinary:  Denies: Urinary frequency,  Incontinence, Hematuria, Urgency,     Nocturia, Dysuria, Testicular problems, Other      Musculoskeletal:  Denies: Joint Pain, Joint Stiffness, Joint Swelling, Myalgias,    Other      Hematologic/lymphatic:  DENIES: Lymphadenopathy, Bruising, Bleeding tendencies,     Other      Neurological:  Denies: Headache, Numbness, Weakness, Seizures, Other      Psychiatric:  Denies: Anxiety, Appropriate Effect, Depression, Other      Sleep:  No: Excessive daytime sleep, Morning Headache?, Snoring, Insomnia?, Stop    breathing at sleep?, Other      Integumentary:  Denies: Rash, Dry skin, Skin Warm to Touch, Other      Immunologic/Allergic:  Denies: Latex allergy, Seasonal allergies, Asthma,     Urticaria, Eczema, Other      Immunization status:  No: Up to date            FAMILY/SOCIAL/MEDICAL HX      Surgical History:  Yes: Bowel Surgery, Cholecystectomy, Head Surgery (sinus x 3    ), Hernia Surgery, Oral Surgery (WISDOM TEETH EXTRACTION), Other Surgeries     (tummy tuck/ sleep apnea surgery)      Stroke - Family Hx:  Mother      Heart - Family Hx:  Father      Diabetes - Family Hx:  Father      Cancer/Type - Family Hx:  Grandparent      Is Father Still Living?:  Yes      Is Mother Still Living?:  Yes       Family History:  Yes      Social History:  No Tobacco Use, No Alcohol Use, No Recreational Drug use      Smoking status:  Former smoker (on and off smoker x 20 y quit 2006)      Anticoagulation Therapy:  No      Antibiotic Prophylaxis:  No      Medical History:  Yes: Arthritis, Asthma (NEB [place],INHALERS), Chronic     Bronchitis/COPD, Diabetes (TYPE II, DOES NOT CHECK IN BS), Hemorrhoids/Rectal     Prob (HIATAL HERNIA, DIARRHEA, VOMITING, NAUSEA, HEMORRHOID), Shortness Of     Breath, Stroke; No: Blood Disease, Chemotherapy/Cancer, Deafness or Ringing     Ears, Sinus Trouble, Miscellaneous Medical/oth      Psychiatric History      none            PREVENTION      Date Influenza Vaccine Given:  Nov 1, 2018      Influenza Vaccine  Declined:  No      2 or More Falls Past Year?:  No      Fall Past Year with Injury?:  No      Hx Pneumococcal Vaccination:  Yes      Encouraged to follow-up with:  PCP regarding preventative exams.      Chart initiated by      sandra macdonald            ALLERGIES/MEDICATIONS      Allergies:        Coded Allergies:             CITALOPRAM (Verified  Allergy, Unknown, ITCH,HIVES, 2/15/19)           LINCOMYCIN (Verified  Allergy, Unknown, ANAPHYLACTIC, 2/15/19)      Medications    Last Reconciled on 2/15/19 14:44 by KAR CERVANTES MD      Beclomethasone Dipropionate (Qvar 80 Redihaler 10.6 GM) 10.6 Gm Hfa.aeroba      1 PUFF INH RTBID, #1 INH 5 Refills         Prov: KAR CERVANTES         2/15/19       Tiotropium Bromide (Spiriva Respimat 1.25 mcg/puff) 4 Gm Mist.inhal      2 PUFFS INH RTQDAY, #1 INH 5 Refills         Prov: KAR CERVANTES         2/15/19       Neb-Budesonide (Pulmicort) 1 Mg Nebu      1 MG INH RTBID, #60 NEB 5 Refills         Prov: KAR CERVANTES         2/15/19       Formoterol Fumarate (Perforomist) 20 Mcg/2 Ml Vial.neb      20 MCG INH BID, #60 NEB 5 Refills         Prov: KAR CERVANTES         2/15/19       Ranitidine Hcl (Ranitidine*) 150 Mg Tablet      150 MG PO BID, #60 TAB 0 Refills         Reported         12/13/18       Aspirin EC (Aspirin EC) 81 Mg Tablet.dr      81 MG PO QDAY, #30 TAB 0 Refills         Reported         12/13/18       Sertraline HCl (Sertraline*) 50 Mg Tablet      50 MG PO QDAY, #30 TAB 0 Refills         Reported         12/13/18       Cholecalciferol (Vitamin D3) 50,000 Unit Capsule      31919 UNITS PO Q7D, CAP         Reported         12/13/18       Hydrocodone/Chlorpheniramine (Hydrocodone/Chlorpheniramine) 473 Ml Mireille.er.12h      5 ML PO BID PRN for COUGH, #60 ML         Reported         12/13/18       Neb-Albuterol/Ipratropium (Ipratropium/Albuterol) 3 Ml Ampul.neb      3 ML INH Q6H PRN for SHORTNESS OF BREATH, #120 NEB 0 Refills         Reported         12/13/18        Azelastine/Fluticasone (Dymista Nasal Spray) 23 Gm Spray.pump      1 SPRAYS NARE EACH BID, #1 BOTTLE 5 Refills         Prov: KAR CERVANTES         11/20/18       Lansoprazole (Lansoprazole*) 30 Mg Capsule.dr      30 MG PO QDAY, CAP         Reported         11/20/18       Promethazine HCl (Phenergan*) 25 Mg Tablet      25 MG PO BID, #60 TAB 0 Refills         Reported         11/20/18       Alprazolam (Alprazolam) 1 Mg Tablet      1 MG PO BID, #60 TAB         Reported         11/20/18       Lisinopril* (Lisinopril*) 10 Mg Tablet      10 MG PO QDAY, #30 TAB 0 Refills         Reported         11/20/18       methylPREDNISolone (Medrol*) 4 Mg Tablet      6 MG PO BID, TAB 0 Refills         Reported         11/20/18       MDI-Albuterol (Ventolin HFA) 8 Gm Hfa.aer.ad      2 PUFFS INH Q4H PRN for SHORTNESS OF BREATH, #1 MDI 0 Refills         Reported         11/20/18       Montelukast Sodium (Singulair*) 10 Mg Tablet      10 MG PO QDAY, #30 TAB 0 Refills         Reported         11/20/18       Roflumilast (Daliresp) 500 Mcg Tab      500 MCG PO QDAY for 30 Days, #30 TAB         Reported         11/20/18       Fexofenadine Hcl (Fexofenadine Hcl) 180 Mg Tablet      180 MG PO QDAY, #30 TAB 0 Refills         Reported         11/20/18      Current Medications      Current Medications Reviewed 2/15/19            EXAM      Vital Signs Reviewed.      General:  WDWN, Alert, NAD.      HEENT: PERRL, EOMI.  OP, nares clear, no sinus tenderness.      Neck: Supple, no JVD, no thyromegaly.      Lymph: No axillary, cervical, supraclavicular lymphadenopathy noted bilaterally.      Chest: Good aeration, tight breath sounds with coarse wheezing noted throughout     all lung fields, tympanic to percussion bilaterally, no work of breathing noted.           CV: RRR, no MGR, pulses 2+, equal.        Abd: Soft, NT, ND, +BS, no HSM.      EXT: No clubbing, no cyanosis, no edema, no joint tenderness.        Neuro:  A  Skin: No rashes or lesions.       Vtials      Vitals:             Height 5 ft 3 in / 160.02 cm           Weight 172 lbs 0 oz / 78.828529 kg           BSA 1.81 m2           BMI 30.5 kg/m2           Temperature 98.1 F / 36.72 C - Oral           Pulse 99           Respirations 12           Blood Pressure 133/88 Sitting, Right Arm           Pulse Oximetry 99%, roomair            REVIEW      Results Reviewed      P[facility] Results Reviewed?:  Yes Prev Lab Results, Yes Prev Radiology Results, Yes     Previous [facility] Records      Lab Results      I reviewed my last office note. I also personally reviewed the office notes from    her rheumatologist including a number of antirheumatic labs and CBCs. CBC showed    no peripheral eosinophilia. The patient had lab work done which shows no obvious    evidence of rheumatoid arthritis or lupus.  I also personally reviewed from a     chest x-ray from 12/2018 showing some mild lower lobe bronchitis.            Assessment      Notes      Renewed Medications      * Formoterol Fumarate (Perforomist) 20 MCG/2 ML VIAL.NEB: 20 MCG INH BID #60         Dx: Severe asthma - J45.909      * Neb-Budesonide (Pulmicort) 1 MG NEBU: 1 MG INH RTBID #60         Instructions: DIAGNOSIS CODE REQUIRED PRIOR TO PRESCRIBING.         Dx: Severe asthma - J45.909      * TIOTROPIUM BROMIDE (Spiriva Respimat 1.25 mcg/puff) 4 GM MIST.INHAL: 2 PUFFS       INH RTQDAY #1      * Beclomethasone Dipropionate (Qvar 80 Redihaler 10.6 GM) 10.6 GM HFA.AEROBA: 1       PUFF INH RTBID #1      Discontinued Medications      * predniSONE* (Deltasone*) 10 MG TABLET: 10 MG PO ASDIR #45         Instructions: 51kzp6g,50yzw1k,06zak5q,60wak3u,32mwi0y      * AMOXICILLIN/CLAVULANATE K (Augmentin 875/125 Mg) 1 EACH TABLET: 875 MG PO BID       #14         Dx: Cough - R05      * Fluconazole (Diflucan) 150 MG TABLET: 150 MG PO ONCE 1 Day #1      IMPRESSION:      1.  Dyspnea on exertion.      2. Chronic cough.      3.  Wheeze.      4. Mucus plugging/clearance of airways.       5.  Acute exacerbation of severe persistent allergic asthma.  Needs to go back     on triple inhaler therapy plus additional Qvar.  Also would benefit from     Dupixent therapy.  Had an allergy to fasenra.  IgE is elevated, but for some     reason Xolair is not covered.      6. Seasonal allergies well-controlled.      7.  Allergic rhinitis, well-controlled.      8. Tobacco abuse with cigarettes in remission.      9.  Alpha 1 antitrypsin heterozygote genotype MZ level 170.               PLAN:      1.  I performed exhale nitric oxide testing in the office today.  Level was     unchanged at 70 signifying a severe degree of eosinophilic airway inflammation.           2. She liked Qvar when she was taking it, but ran out of the sample and     reportedly did not have any at the Pharmacy.  I will refill Qvar 80 now and give    her more samples today.      3.  Continue Spiriva, Perforomist and Pulmicort.      4.  Continue Singulair.      5. Continue Albuterol as needed.      6.  I will begin the process of starting the patient on Dupixent therapy.  It is    of catamount importance that patient be placed on antibiological therapy for her    severe persistent allergic asthma with frequent exacerbations needing multiple     rounds of steroids and reduced FEV1 of 56% of predicted.      7. We will take patient to bronchoscopy with clearance of airways,     bronchoalveolar lavage, brushings and biopsies to rule out other etiologies such    as atypical infections and fungal disease that can cause this.  The risks and     benefits were discussed with the patient who is willing to undergo procedure.      8.  Up-to-date with flu and Pneumovax.  No indication for Prevnar.      9.  Follow up with me two weeks after bronchoscopy.            Patient Education      Patient Education Provided:  Bronchoscopy            Patient Education:        Asthma -- Adult      Other Education:  dupixent            Procedure Orders      Get Consent  signed for:        Please obtain consent for bronchoscopy with bronchoalveolar lavage,      brushings, biopsies.      Risks and Benefits:        I have discussed the risks of the procedure with the patient including      pneumothorax, hemothorax, bleeding, hypoxia, required mechanical      ventilation and death.  The patient recognizes these findings,       acknowledges these findings and is agreeable to the procedure.                 Disclaimer: Converted document may not contain table formatting or lab diagrams. Please see [employer] for the authenticated document.

## 2021-05-28 NOTE — PROGRESS NOTES
Patient: LINDA STEVEN     Acct: [id number]     Report: #[id number]  UNIT #: [id number]     : 1970    Encounter Date:2019  PRIMARY CARE: MONA ADHIKARI  ***Signed***  --------------------------------------------------------------------------------------------------------------------  Chief Complaint      Encounter Date      Mar 13, 2019            Primary Care Provider            mona adhikari            Referring Provider            mona adhikari            Patient Complaint      Patient is complaining of      f/u BRONCHOSCOPY            VITALS      Height 5 ft 4 in / 162.56 cm      Weight 166 lbs 8 oz / 75.299790 kg      BSA 1.81 m2      BMI 28.6 kg/m2      Temperature 98.8 F / 37.11 C - Oral      Pulse 94      Respirations 12      Blood Pressure 126/80 Sitting, Left Arm      Pulse Oximetry 96%, ROOMAIR      Initial Exhaled Nitrous Oxide      Date:  2018      Exhaled Nitrous Oxide Results:  120            Repeated Exhaled Nitrous Oxide      Date:  Mar 13, 2019      Repeated Nitrous Oxide Results:  112            HPI      The patient is a very pleasant 48 year old  female never smoker with     very severe allergic asthma on chronic daily steroid therapy for peripheral     eosinophilia here for follow up.  I took her to bronchoscopy and she grew heavy     growth with Moraxella catarrhalis.  She completed a course of Augmentin.      Bronchoalveolar lavage showed acute inflammation as did endobronchial biopsies.     Unfortunately, Xolair was not approved and she is in the process of getting     Dupixent.  She feels somewhat better since completing antibiotics and her     wheezing is decreased.  She still coughs throughout the day. She is coughing, is    able to clear more secretions and is coughing up thick clear secretions. She is     still using a rescue inhaler 4-6 times a day. She gets short of breath with     walking about 300-400 feet or up a half flight of steps.  Denies any  nausea,     vomiting, fevers, chills, headaches, chest pain or hemoptysis.  She gets short     of breath walking about 300 feet or up one half flight of steps.  Denies any     nausea, vomiting, fevers, chills, headaches or chest pain.  She also has alpha 1    antitrypsin heterozygosity with an FEV1 of 56%.  She is able to perform ADLs     without difficulty and denies any swollen glands or lymph nodes of her head and     neck.            I have personally reviewed the review of systems, past family, social, surgical     and medical histories and I agree with the findings.            ROS      Constitutional:  Denies: Fatigue, Fever, Weight gain, Weight loss, Chills,     Insomnia, Other      Respiratory/Breathing:  Complains of: Shortness of air, Wheezing, Cough; Denies:    Hemoptysis, Pleuritic pain, Other      Endocrine:  Denies: Polydipsia, Polyuria, Heat/cold intolerance, Abnorml     menstrual pattern, Diabetes, Other      Eyes:  Denies: Blurred vision, Vision Changes, Other      Ears, nose, mouth, throat:  Denies: Mouth lesions, Thrush, Throat pain,     Hoarseness, Allergies/Hay Fever, Post Nasal Drip, Headaches, Recent Head Injury,    Nose Bleeding, Neck Stiffness, Thyroid Mass, Hearing Loss, Ear Fullness, Dry     Mouth, Nasal or Sinus Pain, Dry Lips, Nasal discharge, Nasal congestion, Other      Cardiovascular:  Denies: Palpitations, Syncope, Claudication, Chest Pain, Wake     up Gasping for air, Leg Swelling, Irregular Heart Rate, Cyanosis, Dyspnea on     Exertion, Other      Gastrointestinal:  Denies: Nausea, Constipation, Diarrhea, Abdominal pain,     Vomiting, Difficulty Swallowing, Reflux/Heartburn, Dysphagia, Jaundice,     Bloating, Melena, Bloody stools, Other      Genitourinary:  Denies: Urinary frequency, Incontinence, Hematuria, Urgency,     Nocturia, Dysuria, Testicular problems, Other      Musculoskeletal:  Denies: Joint Pain, Joint Stiffness, Joint Swelling, Myalgias,    Other       Hematologic/lymphatic:  DENIES: Lymphadenopathy, Bruising, Bleeding tendencies,     Other      Neurological:  Denies: Headache, Numbness, Weakness, Seizures, Other      Psychiatric:  Denies: Anxiety, Appropriate Effect, Depression, Other      Sleep:  No: Excessive daytime sleep, Morning Headache?, Snoring, Insomnia?, Stop    breathing at sleep?, Other      Integumentary:  Denies: Rash, Dry skin, Skin Warm to Touch, Other      Immunization status:  No: Up to date            FAMILY/SOCIAL/MEDICAL HX      Surgical History:  Yes: Bowel Surgery (HEMORRHOIDECTOMY), Cholecystectomy, Head     Surgery (sinus x 3, UP3 SX. ), Hernia Surgery, Oral Surgery (WISDOM TEETH     EXTRACTION), Other Surgeries (tummy tuck/ sleep apnea surgery)      Stroke - Family Hx:  Mother      Heart - Family Hx:  Father      Diabetes - Family Hx:  Father      Cancer/Type - Family Hx:  Grandparent      Is Father Still Living?:  Yes      Is Mother Still Living?:  Yes       Family History:  Yes      Social History:  No Tobacco Use, No Alcohol Use, No Recreational Drug use      Smoking status:  Former smoker (on and off smoker x 20 y quit 2006)      Anticoagulation Therapy:  No      Antibiotic Prophylaxis:  No      Medical History:  Yes: Arthritis (POLY ), Asthma (NEB [place],INHALERS), Blood     Disease (ANEMIA), Chronic Bronchitis/COPD, Diabetes (TYPE II), Hemorrhoids/Rect    al Prob (HIATAL HERNIA, DIARRHEA, VOMITING, NAUSEA), Shortness Of Breath     (COUGH), Stroke; No: Chemotherapy/Cancer, Deafness or Ringing Ears, Sinus     Trouble, Miscellaneous Medical/oth      Psychiatric History      none            PREVENTION      Date Influenza Vaccine Given:  Nov 1, 2018      Influenza Vaccine Declined:  No      2 or More Falls Past Year?:  No      Fall Past Year with Injury?:  No      Hx Pneumococcal Vaccination:  Yes      Encouraged to follow-up with:  PCP regarding preventative exams.      Chart initiated by      gui/ ma             ALLERGIES/MEDICATIONS      Allergies:        Coded Allergies:             CITALOPRAM (Verified  Allergy, Severe, ITCH,HIVES, 3/13/19)           LINCOMYCIN (Verified  Allergy, Severe, ANAPHYLACTIC, 3/13/19)      Medications    Last Reconciled on 3/13/19 16:49 by KAR CERVANTES MD      Fluconazole (Diflucan) 100 Mg Tablet      100 MG PO QDAY, #3 TAB         Prov: KAR CERVANTES         3/4/19       Ibuprofen (Ibuprofen) 800 Mg Tablet      800 MG PO TID, #90 TAB 0 Refills         Reported         2/27/19       Sertraline HCl (Sertraline*) 100 Mg Tablet      100 MG PO HS, #30 TAB 0 Refills         Reported         2/27/19       Neb-Budesonide (Pulmicort) 1 Mg Nebu      1 MG INH RTBID, #60 NEB 5 Refills         Prov: KAR CERVANTES         2/15/19       Formoterol Fumarate (Perforomist) 20 Mcg/2 Ml Vial.neb      20 MCG INH BID, #60 NEB 5 Refills         Prov: KAR CERVANTES         2/15/19       Aspirin EC (Aspirin EC) 81 Mg Tablet.dr      81 MG PO HS, #30 TAB 0 Refills         Reported         12/13/18       Cholecalciferol (Vitamin D3) 50,000 Unit Capsule      16975 UNITS PO Q7D, CAP         Reported         12/13/18       Hydrocodone/Chlorpheniramine (Hydrocodone/Chlorpheniramine) 473 Ml Mireille.er.12h      5 ML PO BID PRN for COUGH, #60 ML         Reported         12/13/18       Neb-Albuterol/Ipratropium (Ipratropium/Albuterol) 3 Ml Ampul.neb      3 ML INH Q6H PRN for SHORTNESS OF BREATH, #120 NEB 0 Refills         Reported         12/13/18       Azelastine/Fluticasone (Dymista Nasal Spray) 23 Gm Spray.pump      1 SPRAYS NARE EACH BID, #1 BOTTLE 5 Refills         Prov: KAR CERVANTES         11/20/18       Lansoprazole (Lansoprazole) 30 Mg Capsule.dr      30 MG PO HS, CAP         Reported         11/20/18       Promethazine HCl (Phenergan*) 25 Mg Tablet      25 MG PO BID PRN for NAUSEA, #60 TAB 0 Refills         Reported         11/20/18       Alprazolam (Alprazolam) 1 Mg Tablet      1 MG PO BID, #60 TAB         Reported          11/20/18       Lisinopril* (Lisinopril*) 10 Mg Tablet      10 MG PO HS, #30 TAB 0 Refills         Reported         11/20/18       methylPREDNISolone (Medrol*) 4 Mg Tablet      6 MG PO HS, TAB 0 Refills         Reported         11/20/18       MDI-Albuterol (Ventolin HFA) 8 Gm Hfa.aer.ad      2 PUFFS INH Q4H PRN for SHORTNESS OF BREATH, #1 MDI 0 Refills         Reported         11/20/18       Montelukast Sodium (Singulair*) 10 Mg Tablet      10 MG PO HS, #30 TAB 0 Refills         Reported         11/20/18       Roflumilast (Daliresp) 500 Mcg Tab      500 MCG PO HS for 30 Days, #30 TAB         Reported         11/20/18       Fexofenadine Hcl (Fexofenadine Hcl) 180 Mg Tablet      180 MG PO HS, #30 TAB 0 Refills         Reported         11/20/18      Current Medications      Current Medications Reviewed 3/13/19            EXAM      Vital Signs Reviewed      Gen: WDWN, Alert, NAD.        HEENT:  PERRL, EOMI.  OP, nares clear, no sinus tenderness.      Chest:  Good aeration, diminished breath sounds with course wheezing and rhonchi    in all lung fields, tympanic to percussion bilaterally, no work of breathing     noted.      CV:  RRR, no MGR, pulses 2+, equal.      Abd:  Soft, NT, ND, + BS, no HSM.       EXT:  No clubbing, no cyanosis, no edema.       Neuro:  A  Skin: No rashes or      Vtials      Vitals:             Height 5 ft 4 in / 162.56 cm           Weight 166 lbs 8 oz / 75.745925 kg           BSA 1.81 m2           BMI 28.6 kg/m2           Temperature 98.8 F / 37.11 C - Oral           Pulse 94           Respirations 12           Blood Pressure 126/80 Sitting, Left Arm           Pulse Oximetry 96%, ROOMAIR            REVIEW      Results Reviewed      P[facility] Results Reviewed?:  Yes Prev Lab Results, Yes Prev Radiology Results, Yes     Previous [facility] Records      Lab Results      I reviewed my last office note.  I reviewed my bronchoscopy note.  Bronchoscopy     cultures with Moraxella catarrhalis.  Bronchoscopy  pathology of the     bronchoalveolar lavage just shows acute inflammation.            Assessment      Notes      Discontinued Medications      * TIOTROPIUM BROMIDE (Spiriva Respimat 1.25 mcg/puff) 4 GM MIST.INHAL: 2 PUFFS       INH RTQDAY #1      * Amoxicillin/Clavulanic Acid 875/125 (Augmentin 875/125) 1 EACH TABLET: 875 MG       PO BID 7 Days #14      IMPRESSION:      1.  Moraxella catarrhalis bronchitis, resolved.      2.  Very severe persistent eosinophilic allergic asthma with frequent     exacerbations, now on chronic steroid therapy, triple inhaler nebulizer therapy     and Qvar.  She had an allergy to fasenra and insurance declined Xolair. She will    need dupixent.  Asthma control test score is 4 today signifying poor control of     underlying disease on current therapies.      3.  Dyspnea on exertion.        4. Chronic cough and wheeze.      5. Seasonal allergy, well-controlled.      6. Allergic rhinitis, well-controlled.      7. Tobacco abuse with cigarettes in remission.      8.  Alpha 1 antitrypsin heterozygote genotype MZ level of 170.             PLAN:      1.  FENO was checked today and is severe elevated at 109 consistent with     eosinophilic airway inflammation.      2.  Continue Qvar, Spiriva, Perforomist and Pulmicort.      3. Continue Singulair.      4. Continue albuterol as needed.      5. We will follow up on her dupixent as this will be catamount to help treat her    eosinphilic component of her asthma.  She needs to be on some sort of biologic     therapy for asthma given her reduced EF of 56%, need for chronic steroids and     frequent flare ups.      6.  She is up-to-date with her flu and Pneumovax with no indication for Prevnar.      7. Follow up with me in 2-3 months.            Patient Education            Patient Education:        Asthma -- Adult                 Disclaimer: Converted document may not contain table formatting or lab diagrams. Please see [facility] for the authenticated document.

## 2021-05-28 NOTE — PROGRESS NOTES
Patient: LINDA STEVEN     Acct: [id number]     Report: #[id number]  UNIT #: [id number]     : 1970    Encounter Date:2018  PRIMARY CARE: MONA ADHIKARI  ***Signed***  --------------------------------------------------------------------------------------------------------------------  Chief Complaint      Encounter Date      2018            Primary Care Provider            mona adhikari            Referring Provider            mona adhikari            Patient Complaint      Patient is complaining of      asthma/ legioners            VITALS      Height 5 ft 4 in / 162.56 cm      Weight 165 lbs 8 oz / 75.276901 kg      BSA 1.80 m2      BMI 28.4 kg/m2      Temperature 98.4 F / 36.89 C - Oral      Pulse 126      Respirations 16      Blood Pressure 139/91 Sitting, Left Arm      Pulse Oximetry 99%, roomair      Initial Exhaled Nitrous Oxide      Date:  2018      Exhaled Nitrous Oxide Results:  120            HPI      The patient is a very pleasant 48 year old  female former cigarette     smoker with severe persistent eosinophilic asthma on triple inhaler therapy,     Daliresp, Singulair,  Flonase and fasenra therapy here for evaluation.             The patient had pulmonary function tests done yesterday showing moderately     severe airflow obstruction with FEV1 56% predicted with no bronchodilator     response testing performed. She has a history of significant bronchodilator     response previously. The patient has seen [facility] as well as a    pulmonologist at an [facility] hospital. She feels that none of these medications     have ever helped her. She was on Xolair a number of years ago and notes that she    is not sure if that helped at all. She was started on fasenra recently and while    it has depleted her eosinophils, she has no changes in her symptoms. She flares     up almost every month and is almost steroid dependent at this point. 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 She can only walk about    50-60 feet without having to stop because of severe shortness of breath, worse     with exertion, relieved with rest. She is constantly coughing and wheezing. She     has itchy, scratchy throat and watery eyes and constant nasal congestion. Her     allergist recently started her on allergy immunotherapy. She had bronchoscopy     done in July 2018 showing mucous plugging but no obvious infectious etiologies.     Despite all the frequent steroids her last IgE was done in 2018 and was 57.     Other immunoglobulins were unremarkable. She is alpha-1 antitrypsin heterozygote    genotype MS with level 175. She does not like the fasenra and is constantly     itching and has a rash and joint pains and she is worried about an immune     reaction. She is wondering about going off fasenra to another agent. She lives     with her  and has significant allergy symptoms.  She has no pet birds,     cats or dogs. She does have mold and hay exposure. She quit smoking about 15     years ago.             I have personally reviewed the Review of Systems, past family, social, surgical     and medical histories and I agree with the findings.            ROS      Constitutional:  Denies: Fatigue, Fever, Weight gain, Weight loss, Chills,     Insomnia, Other      Respiratory/Breathing:  Complains of: Shortness of air, Wheezing, Cough; Denies:    Hemoptysis, Pleuritic pain, Other      Endocrine:  Denies: Polydipsia, Polyuria, Heat/cold intolerance, Abnorml     menstrual pattern, Diabetes, Other      Eyes:  Denies: Blurred vision, Vision Changes, Other      Ears, nose, mouth, throat:  Denies: Mouth lesions, Thrush, Throat pain,     Hoarseness, Allergies/Hay Fever, Post Nasal Drip, Headaches, Recent Head Injury,    Nose Bleeding, Neck Stiffness, Thyroid Mass, Hearing Loss, Ear Fullness, Dry     Mouth, Nasal or Sinus Pain, Dry Lips, Nasal discharge, Nasal  congestion, Other      Cardiovascular:  Denies: Palpitations, Syncope, Claudication, Chest Pain, Wake     up Gasping for air, Leg Swelling, Irregular Heart Rate, Cyanosis, Dyspnea on     Exertion, Other      Gastrointestinal:  Denies: Nausea, Constipation, Diarrhea, Abdominal pain,     Vomiting, Difficulty Swallowing, Reflux/Heartburn, Dysphagia, Jaundice,     Bloating, Melena, Bloody stools, Other      Genitourinary:  Denies: Urinary frequency, Incontinence, Hematuria, Urgency,     Nocturia, Dysuria, Testicular problems, Other      Musculoskeletal:  Denies: Joint Pain, Joint Stiffness, Joint Swelling, Myalgias,    Other      Hematologic/lymphatic:  DENIES: Lymphadenopathy, Bruising, Bleeding tendencies,     Other      Neurological:  Denies: Headache, Numbness, Weakness, Seizures, Other      Psychiatric:  Denies: Anxiety, Appropriate Effect, Depression, Other      Sleep:  No: Excessive daytime sleep, Morning Headache?, Snoring, Insomnia?, Stop    breathing at sleep?, Other      Integumentary:  Denies: Rash, Dry skin, Skin Warm to Touch, Other      Immunologic/Allergic:  Denies: Latex allergy, Seasonal allergies, Asthma,     Urticaria, Eczema, Other      Immunization status:  No: Up to date            FAMILY/SOCIAL/MEDICAL HX      Surgical History:  Yes: Cholecystectomy, Head Surgery (sinus x 3), Hernia     Surgery, Other Surgeries (tummy tuck/ sleep apnea surgery)      Stroke - Family Hx:  Mother      Heart - Family Hx:  Father      Diabetes - Family Hx:  Father      Cancer/Type - Family Hx:  Grandparent      Is Father Still Living?:  Yes      Is Mother Still Living?:  Yes       Family History:  Yes      Social History:  No Tobacco Use, No Alcohol Use, No Recreational Drug use      Smoking status:  Former smoker (on and off smoker x 20 y quit 2006)      Anticoagulation Therapy:  No      Antibiotic Prophylaxis:  No      Medical History:  Yes: Asthma, Diabetes, Stroke      Psychiatric History      none             PREVENTION      Hx Influenza Vaccination:  Yes      Date Influenza Vaccine Given:  Nov 1, 2018      Influenza Vaccine Declined:  No      2 or More Falls Past Year?:  No      Fall Past Year with Injury?:  No      Hx Pneumococcal Vaccination:  Yes      Encouraged to follow-up with:  PCP regarding preventative exams.      Chart initiated by      benjamin ponce/ ma            ALLERGIES/MEDICATIONS      Allergies:        Coded Allergies:             Lincocin (Verified  Allergy, 11/20/18)      Medications    Last Reconciled on 11/20/18 11:25 by KAR CERVANTES MD      Omalizumab (Xolair*) 150 Mg Vial      1 UNIT SUBQ ONCE, #1 VIAL 5 Refills         Prov: KAR CERVANTES         11/20/18       Neb-Budesonide (Pulmicort) 1 Mg Nebu      1 MG INH RTBID, #60 NEB 5 Refills         Prov: KAR CERVANTES         11/20/18       Formoterol Fumarate (Perforomist) 20 Mcg/2 Ml Vial.neb      20 MCG INH BID, #60 NEB 5 Refills         Prov: KAR CERVANTES         11/20/18       Azelastine/Fluticasone (Dymista Nasal Spray) 23 Gm Spray.pump      1 SPRAYS NARE EACH BID, #1 BOTTLE 5 Refills         Prov: KAR CERVANTES         11/20/18       Lansoprazole (Lansoprazole*) 30 Mg Capsule.dr      30 MG PO QDAY, CAP         Reported         11/20/18       Epinephrine HCl (Epipen 2-Hao) 0.3 Mg/0.3 Ml Auto.injct               Reported         11/20/18       Cyclobenzaprine Hcl (Cyclobenzaprine*) 10 Mg Tablet      10 MG PO TID PRN for MUSCLE SPASMS, TAB 0 Refills         Reported         11/20/18       Promethazine HCl (Phenergan*) 25 Mg Tablet      25 MG PO BID, #60 TAB 0 Refills         Reported         11/20/18       Alprazolam (Alprazolam) 1 Mg Tablet      1 MG PO BID, #60 TAB         Reported         11/20/18       Ergocalciferol (Vitamin D2*) 50,000 Units Capsule      01291 UNITS PO Fr, #8 CAP 0 Refills         Reported         11/20/18       Ibuprofen (Ibuprofen) 800 Mg Tablet      800 MG PO TID, #90 TAB 0 Refills         Reported         11/20/18        Lisinopril* (Lisinopril*) 10 Mg Tablet      10 MG PO QDAY, #30 TAB 0 Refills         Reported         11/20/18       Chlorpheniramine/HYDROcodone (Tussionex) 473 Ml Mireille.er.12h      5 ML PO BID, #240 ML         Reported         11/20/18       methylPREDNISolone (Medrol*) 4 Mg Tablet      4 MG PO QDAY, TAB 0 Refills         Reported         11/20/18       Tiotropium Bromide (Spiriva Respimat 2.5 mcg/Puff) 4 Gm Mist.inhal      2 PUFFS INH RTQDAY, #1 MDI 0 Refills         Reported         11/20/18       MDI-Albuterol (Ventolin HFA) 8 Gm Hfa.aer.ad      2 PUFFS INH Q4H PRN for SHORTNESS OF BREATH, #1 MDI 0 Refills         Reported         11/20/18       Montelukast Sodium (Singulair*) 10 Mg Tablet      10 MG PO QDAY, #30 TAB 0 Refills         Reported         11/20/18       Roflumilast (Daliresp) 500 Mcg Tab      500 MCG PO QDAY for 30 Days, #30 TAB         Reported         11/20/18       Prednisolone* (Prednisolone*) 5 Mg Tab      40 MG PO QDAY, TAB         Reported         11/20/18       NEB-Albuterol Sulf (Albuterol) 2.5 Mg/3 Ml Vial.neb      2.5 MG INH Q4H PRN for SHORTNESS OF BREATH, #120 NEB 0 Refills         Reported         11/20/18       Fexofenadine Hcl (Fexofenadine Hcl) 180 Mg Tablet      180 MG PO QDAY, #30 TAB 0 Refills         Reported         11/20/18      Current Medications      Current Medications Reviewed 11/20/18            EXAM      Vital Signs Reviewed      Gen: WDWN, Alert, NAD.        HEENT:  PERRL, EOMI.  OP, nares clear, no sinus tenderness.      Neck:  Supple, no JVD, no thyromegaly.      Lymph: No axillary, cervical, supraclavicular lymphadenopathy noted bilaterally.      Chest:  Good aeration, diminished breath sounds with course wheezing and rhonchi    in all lung fields, tympanic to percussion bilaterally, no work of breathing     noted.      CV:  RRR, no MGR, pulses 2+, equal.      Abd:  Soft, NT, ND, + BS, no HSM.       EXT:  No clubbing, no cyanosis, no edema, no joint tenderness.        Neuro:  A  Skin: No rashes or lesions.      Vtials      Vitals:             Height 5 ft 4 in / 162.56 cm           Weight 165 lbs 8 oz / 75.803254 kg           BSA 1.80 m2           BMI 28.4 kg/m2           Temperature 98.4 F / 36.89 C - Oral           Pulse 126           Respirations 16           Blood Pressure 139/91 Sitting, Left Arm           Pulse Oximetry 99%, roomair            REVIEW      Results Reviewed      PCCS Results Reviewed?:  Yes Prev Lab Results, Yes Prev Radiology Results, Yes     Previous [facility] Records      Lab Results      I personally reviewed the patient's bronchoscopy and pathology from her p    ulmonologist in July 2017 showing mucous plugging. The bronchoalveolar lavage,     acid fast bacillus and fungal cultures were all negative. The patient did have     an elevated IgE above 60.      Radiographic Results      The patient had chest imaging which I do not have data for.      PFT Results      I personally reviewed the patient's pulmonary function tests showing FEV1 of 55%    with significant bronchodilator response.            Assessment      Severe asthma         Severe persistent asthma without complication - J45.50         Asthma persistence: persistent         Asthma complication type: uncomplicated            SINGH (dyspnea on exertion) - R06.09            Wheeze - R06.2            Cough - R05            Seasonal allergies - J30.2            Allergic rhinitis         Seasonal allergic rhinitis due to pollen - J30.1         Allergic rhinitis trigger: pollen         Allergic rhinitis seasonality: seasonal            Notes      New Medications      * Fexofenadine Hcl 180 MG TABLET: 180 MG PO QDAY #30      * NEB-Albuterol Sulf (Albuterol) 2.5 MG/3 ML VIAL.NEB: 2.5 MG INH Q4H PRN       SHORTNESS OF BREATH #120         Instructions: DIAGNOSIS CODE REQUIRED PRIOR TO PRESCRIBING.      * Prednisolone* 5 MG TAB: 40 MG PO QDAY      * ROFLUMILAST (Daliresp) 500 MCG TAB: 500 MCG PO QDAY 30  Days #30      * MONTELUKAST SODIUM (Singulair*) 10 MG TABLET: 10 MG PO QDAY #30      * MDI-Albuterol (Ventolin HFA) 8 GM HFA.AER.AD: 2 PUFFS INH Q4H PRN SHORTNESS OF      BREATH #1      * TIOTROPIUM BROMIDE (Spiriva Respimat 2.5 mcg/Puff) 4 GM MIST.INHAL: 2 PUFFS       INH RTQDAY #1      * methylPREDNISolone (Medrol*) 4 MG TABLET: 4 MG PO QDAY      * Chlorpheniramine/HYDROcodone (Tussionex) 473 ML ISMA.ER.12H: 5 ML PO BID #240      * Lisinopril* 10 MG TABLET: 10 MG PO QDAY #30      * Ibuprofen 800 MG TABLET: 800 MG PO TID #90      * ERGOCALCIFEROL (Vitamin D2*) 50,000 UNITS CAPSULE: 50,000 UNITS PO Fr #8      * ALPRAZOLAM (Alprazolam) 1 MG TABLET: 1 MG PO BID #60      * Promethazine HCl (Phenergan*) 25 MG TABLET: 25 MG PO BID #60      * CYCLOBENZAPRINE HCL (Cyclobenzaprine*) 10 MG TABLET: 10 MG PO TID PRN MUSCLE       SPASMS      * Epinephrine HCl (Epipen 2-Hao) 0.3 MG/0.3 ML AUTO.INJCT:       * Lansoprazole (Lansoprazole*) 30 MG CAPSULE.DR: 30 MG PO QDAY      * AZELASTINE/FLUTICASONE (Dymista Nasal [place]) 23 GM SPRAY.PUMP: 1 SPRAYS NARE       EACH BID #1         Dx: Severe asthma - J45.909      * Formoterol Fumarate (Perforomist) 20 MCG/2 ML VIAL.NEB: 20 MCG INH BID #60         Dx: Severe asthma - J45.909      * Neb-Budesonide (Pulmicort) 1 MG NEBU: 1 MG INH RTBID #60         Instructions: DIAGNOSIS CODE REQUIRED PRIOR TO PRESCRIBING.         Dx: Severe asthma - J45.909      * Omalizumab (Xolair*) 150 MG VIAL: 1 UNIT SUBQ ONCE #1         Dx: Severe asthma - J45.909      New Diagnostics      * Chest W/O Cont CT, SCHEDULED PROCEDURE         Dx: Severe asthma - J45.909      New Office Procedures      * Solu-Medrol 125 MG, As Soon As Possible         METHYLPRED SOD SUCC (Solu-Medrol) 125 MG VIAL: 125 MILLIGRAM INTRAMUSC Qty 1        VIAL         Dx: Severe asthma - J45.909      IMPRESSION:       1. Dyspnea on exertion.        2. Cough.       3. Wheeze.        4. Severe persistent eosinophilic asthma, poorly controlled  despite triple     inhaler therapy, Daliresp, immune therapy and fasenra and multiple rounds of     steroids. Asthma control test score is 4 today signifying very poor control of     underlying disease. Of note, the only thing that give her benefit is nebulizers     and she states she was on Pulmicort with some benefit. She is also concerned     about a fasenra allergy.       5. Seasonal allergies poorly controlled.       6. Allergic rhinitis poorly controlled.       7. Tobacco abuse of cigarettes in remission.       8. Alpha-1 antitrypsin heterozygote genotype MS level 175.             PLAN:      1. I performed exhaled nitrous oxide testing in the office today.  Her level is     120 consistent with a severe degree of eosinophilic airway inflammation.       2. Discontinue Symbicort and start patient on Perforomist and Pulmicort     nebulizers twice daily. Nebulizer education provided today.       3. Discontinue Flonase and start Dymista nasal spray.       4. Continue Spiriva 2.5. Inhaler education provided today.       5. Continue Singulair.       6. No indication to repeat pulmonary function tests.       7.  Discontinue fasenra and give 125 mg of Solu-Medrol today as she is having a     reaction to it.       8. The patient does have elevated IgE and we will switch the patient over to     Xolair injections to see if this can help her severe eosinophilic asthma.      9. We will check noncontrast chest CT scan.       10. The patient is up to date with Pneumovax and flu vaccines, no indication for    Prevnar.       11. No indication to treat alpha-1 antitrypsin deficiency at this point given     normal level.       12. We will have the patient follow up with me in 1 month to reassess symptoms     and discuss test results.            Patient Education      Patient Education Provided:  How to use an Inhaler, How to use a Nebulizer            Patient Education:        Asthma -- Adult      Other Education:  xolair therapy.  Alpha-1 mutation                 Disclaimer: Converted document may not contain table formatting or lab diagrams. Please see [facility] for the authenticated document.